# Patient Record
Sex: FEMALE | Race: WHITE | HISPANIC OR LATINO | Employment: UNEMPLOYED | URBAN - METROPOLITAN AREA
[De-identification: names, ages, dates, MRNs, and addresses within clinical notes are randomized per-mention and may not be internally consistent; named-entity substitution may affect disease eponyms.]

---

## 2024-05-03 ENCOUNTER — HOSPITAL ENCOUNTER (OUTPATIENT)
Facility: HOSPITAL | Age: 44
Discharge: HOME | End: 2024-05-04
Attending: EMERGENCY MEDICINE | Admitting: HOSPITALIST

## 2024-05-03 DIAGNOSIS — K52.9 ENTERITIS: ICD-10-CM

## 2024-05-03 DIAGNOSIS — R10.84 GENERALIZED ABDOMINAL PAIN: ICD-10-CM

## 2024-05-03 DIAGNOSIS — A41.9 SEPSIS, DUE TO UNSPECIFIED ORGANISM, UNSPECIFIED WHETHER ACUTE ORGAN DYSFUNCTION PRESENT: Primary | ICD-10-CM

## 2024-05-03 DIAGNOSIS — R07.9 CHEST PAIN: ICD-10-CM

## 2024-05-03 LAB
ALBUMIN SERPL BCP-MCNC: 4.1 G/DL (ref 3.5–5.2)
ALLENS TEST: ABNORMAL
ALP SERPL-CCNC: 85 U/L (ref 55–135)
ALT SERPL W/O P-5'-P-CCNC: 20 U/L (ref 10–44)
ANION GAP SERPL CALC-SCNC: 11 MMOL/L (ref 8–16)
AST SERPL-CCNC: 16 U/L (ref 10–40)
B-HCG UR QL: NEGATIVE
BASOPHILS # BLD AUTO: 0.04 K/UL (ref 0–0.2)
BASOPHILS NFR BLD: 0.2 % (ref 0–1.9)
BILIRUB SERPL-MCNC: 0.4 MG/DL (ref 0.1–1)
BILIRUB UR QL STRIP: NEGATIVE
BUN SERPL-MCNC: 16 MG/DL (ref 6–20)
CALCIUM SERPL-MCNC: 9.4 MG/DL (ref 8.7–10.5)
CHLORIDE SERPL-SCNC: 109 MMOL/L (ref 95–110)
CLARITY UR: ABNORMAL
CO2 SERPL-SCNC: 20 MMOL/L (ref 23–29)
COLOR UR: YELLOW
CREAT SERPL-MCNC: 0.9 MG/DL (ref 0.5–1.4)
CTP QC/QA: YES
DIFFERENTIAL METHOD BLD: ABNORMAL
EOSINOPHIL # BLD AUTO: 0 K/UL (ref 0–0.5)
EOSINOPHIL NFR BLD: 0.1 % (ref 0–8)
ERYTHROCYTE [DISTWIDTH] IN BLOOD BY AUTOMATED COUNT: 11.8 % (ref 11.5–14.5)
EST. GFR  (NO RACE VARIABLE): >60 ML/MIN/1.73 M^2
GLUCOSE SERPL-MCNC: 130 MG/DL (ref 70–110)
GLUCOSE UR QL STRIP: NEGATIVE
HCO3 UR-SCNC: 17.8 MMOL/L (ref 24–28)
HCT VFR BLD AUTO: 41.8 % (ref 37–48.5)
HGB BLD-MCNC: 14.2 G/DL (ref 12–16)
HGB UR QL STRIP: NEGATIVE
IMM GRANULOCYTES # BLD AUTO: 0.09 K/UL (ref 0–0.04)
IMM GRANULOCYTES NFR BLD AUTO: 0.6 % (ref 0–0.5)
KETONES UR QL STRIP: ABNORMAL
LACTATE SERPL-SCNC: 1.7 MMOL/L (ref 0.5–2.2)
LACTATE SERPL-SCNC: 3 MMOL/L (ref 0.5–2.2)
LEUKOCYTE ESTERASE UR QL STRIP: NEGATIVE
LIPASE SERPL-CCNC: 22 U/L (ref 4–60)
LYMPHOCYTES # BLD AUTO: 0.7 K/UL (ref 1–4.8)
LYMPHOCYTES NFR BLD: 4 % (ref 18–48)
MCH RBC QN AUTO: 31.3 PG (ref 27–31)
MCHC RBC AUTO-ENTMCNC: 34 G/DL (ref 32–36)
MCV RBC AUTO: 92 FL (ref 82–98)
MONOCYTES # BLD AUTO: 0.7 K/UL (ref 0.3–1)
MONOCYTES NFR BLD: 4.1 % (ref 4–15)
NEUTROPHILS # BLD AUTO: 14.8 K/UL (ref 1.8–7.7)
NEUTROPHILS NFR BLD: 91 % (ref 38–73)
NITRITE UR QL STRIP: NEGATIVE
NRBC BLD-RTO: 0 /100 WBC
PCO2 BLDA: 35.6 MMHG (ref 35–45)
PH SMN: 7.31 [PH] (ref 7.35–7.45)
PH UR STRIP: 5 [PH] (ref 5–8)
PLATELET # BLD AUTO: 242 K/UL (ref 150–450)
PMV BLD AUTO: 8.7 FL (ref 9.2–12.9)
PO2 BLDA: 41 MMHG (ref 40–60)
POC BE: -8 MMOL/L
POC SATURATED O2: 71 % (ref 95–100)
POC TCO2: 19 MMOL/L (ref 24–29)
POTASSIUM SERPL-SCNC: 3.4 MMOL/L (ref 3.5–5.1)
PROT SERPL-MCNC: 7.7 G/DL (ref 6–8.4)
PROT UR QL STRIP: ABNORMAL
RBC # BLD AUTO: 4.54 M/UL (ref 4–5.4)
SAMPLE: ABNORMAL
SITE: ABNORMAL
SODIUM SERPL-SCNC: 140 MMOL/L (ref 136–145)
SP GR UR STRIP: >1.03 (ref 1–1.03)
URN SPEC COLLECT METH UR: ABNORMAL
UROBILINOGEN UR STRIP-ACNC: NEGATIVE EU/DL
WBC # BLD AUTO: 16.24 K/UL (ref 3.9–12.7)

## 2024-05-03 PROCEDURE — 83690 ASSAY OF LIPASE: CPT | Performed by: NURSE PRACTITIONER

## 2024-05-03 PROCEDURE — 99900035 HC TECH TIME PER 15 MIN (STAT)

## 2024-05-03 PROCEDURE — 25500020 PHARM REV CODE 255: Performed by: NURSE PRACTITIONER

## 2024-05-03 PROCEDURE — G0378 HOSPITAL OBSERVATION PER HR: HCPCS

## 2024-05-03 PROCEDURE — 96361 HYDRATE IV INFUSION ADD-ON: CPT

## 2024-05-03 PROCEDURE — 96365 THER/PROPH/DIAG IV INF INIT: CPT

## 2024-05-03 PROCEDURE — 96366 THER/PROPH/DIAG IV INF ADDON: CPT

## 2024-05-03 PROCEDURE — 80053 COMPREHEN METABOLIC PANEL: CPT | Performed by: NURSE PRACTITIONER

## 2024-05-03 PROCEDURE — 36415 COLL VENOUS BLD VENIPUNCTURE: CPT | Performed by: PHYSICIAN ASSISTANT

## 2024-05-03 PROCEDURE — 25000003 PHARM REV CODE 250: Performed by: NURSE PRACTITIONER

## 2024-05-03 PROCEDURE — 82803 BLOOD GASES ANY COMBINATION: CPT

## 2024-05-03 PROCEDURE — 83605 ASSAY OF LACTIC ACID: CPT | Mod: 91 | Performed by: PHYSICIAN ASSISTANT

## 2024-05-03 PROCEDURE — 83605 ASSAY OF LACTIC ACID: CPT | Performed by: NURSE PRACTITIONER

## 2024-05-03 PROCEDURE — 85025 COMPLETE CBC W/AUTO DIFF WBC: CPT | Performed by: NURSE PRACTITIONER

## 2024-05-03 PROCEDURE — 81003 URINALYSIS AUTO W/O SCOPE: CPT | Performed by: NURSE PRACTITIONER

## 2024-05-03 PROCEDURE — 96374 THER/PROPH/DIAG INJ IV PUSH: CPT | Mod: 59

## 2024-05-03 PROCEDURE — 63600175 PHARM REV CODE 636 W HCPCS: Performed by: NURSE PRACTITIONER

## 2024-05-03 PROCEDURE — 87040 BLOOD CULTURE FOR BACTERIA: CPT | Performed by: NURSE PRACTITIONER

## 2024-05-03 PROCEDURE — 99285 EMERGENCY DEPT VISIT HI MDM: CPT | Mod: 25

## 2024-05-03 PROCEDURE — 96375 TX/PRO/DX INJ NEW DRUG ADDON: CPT

## 2024-05-03 PROCEDURE — 81025 URINE PREGNANCY TEST: CPT | Performed by: NURSE PRACTITIONER

## 2024-05-03 RX ORDER — SODIUM CHLORIDE 0.9 % (FLUSH) 0.9 %
10 SYRINGE (ML) INJECTION EVERY 12 HOURS PRN
Status: DISCONTINUED | OUTPATIENT
Start: 2024-05-03 | End: 2024-05-04 | Stop reason: HOSPADM

## 2024-05-03 RX ORDER — ACETAMINOPHEN 325 MG/1
650 TABLET ORAL EVERY 6 HOURS PRN
Status: DISCONTINUED | OUTPATIENT
Start: 2024-05-03 | End: 2024-05-04 | Stop reason: HOSPADM

## 2024-05-03 RX ORDER — ONDANSETRON HYDROCHLORIDE 2 MG/ML
8 INJECTION, SOLUTION INTRAVENOUS
Status: COMPLETED | OUTPATIENT
Start: 2024-05-03 | End: 2024-05-03

## 2024-05-03 RX ORDER — NALOXONE HCL 0.4 MG/ML
0.02 VIAL (ML) INJECTION
Status: DISCONTINUED | OUTPATIENT
Start: 2024-05-03 | End: 2024-05-04 | Stop reason: HOSPADM

## 2024-05-03 RX ORDER — IBUPROFEN 200 MG
24 TABLET ORAL
Status: DISCONTINUED | OUTPATIENT
Start: 2024-05-03 | End: 2024-05-04 | Stop reason: HOSPADM

## 2024-05-03 RX ORDER — KETOROLAC TROMETHAMINE 30 MG/ML
15 INJECTION, SOLUTION INTRAMUSCULAR; INTRAVENOUS
Status: COMPLETED | OUTPATIENT
Start: 2024-05-03 | End: 2024-05-03

## 2024-05-03 RX ORDER — SODIUM CHLORIDE 9 MG/ML
INJECTION, SOLUTION INTRAVENOUS CONTINUOUS
Status: DISCONTINUED | OUTPATIENT
Start: 2024-05-03 | End: 2024-05-04 | Stop reason: HOSPADM

## 2024-05-03 RX ORDER — GLUCAGON 1 MG
1 KIT INJECTION
Status: DISCONTINUED | OUTPATIENT
Start: 2024-05-03 | End: 2024-05-04 | Stop reason: HOSPADM

## 2024-05-03 RX ORDER — ONDANSETRON HYDROCHLORIDE 2 MG/ML
4 INJECTION, SOLUTION INTRAVENOUS EVERY 6 HOURS PRN
Status: DISCONTINUED | OUTPATIENT
Start: 2024-05-03 | End: 2024-05-04 | Stop reason: HOSPADM

## 2024-05-03 RX ORDER — IBUPROFEN 200 MG
16 TABLET ORAL
Status: DISCONTINUED | OUTPATIENT
Start: 2024-05-03 | End: 2024-05-04 | Stop reason: HOSPADM

## 2024-05-03 RX ADMIN — PIPERACILLIN AND TAZOBACTAM 4.5 G: 4; .5 INJECTION, POWDER, LYOPHILIZED, FOR SOLUTION INTRAVENOUS; PARENTERAL at 06:05

## 2024-05-03 RX ADMIN — PIPERACILLIN AND TAZOBACTAM 4.5 G: 4; .5 INJECTION, POWDER, LYOPHILIZED, FOR SOLUTION INTRAVENOUS; PARENTERAL at 10:05

## 2024-05-03 RX ADMIN — IOHEXOL 80 ML: 350 INJECTION, SOLUTION INTRAVENOUS at 09:05

## 2024-05-03 RX ADMIN — SODIUM CHLORIDE 1000 ML: 9 INJECTION, SOLUTION INTRAVENOUS at 08:05

## 2024-05-03 RX ADMIN — KETOROLAC TROMETHAMINE 15 MG: 30 INJECTION, SOLUTION INTRAMUSCULAR at 08:05

## 2024-05-03 RX ADMIN — SODIUM CHLORIDE: 9 INJECTION, SOLUTION INTRAVENOUS at 12:05

## 2024-05-03 RX ADMIN — ONDANSETRON 8 MG: 2 INJECTION INTRAMUSCULAR; INTRAVENOUS at 08:05

## 2024-05-03 NOTE — ASSESSMENT & PLAN NOTE
Presents with nausea, vomiting, diarrhea. WBC of 16, HR of 108, lactic acid 3.0. CT scan with findings suggesting enteritis.   NPO-advance as tolerated  Zosyn  Blood cultures pending  Check stool studies, with culture, WBC, and C diff.   CT scan with upper limits of normal appendix without findings suggesting inflammation. No discrete RLQ tenderness. Suspect symptoms related to eneteritis.       This patient does have evidence of infective focus  My overall impression is sepsis.  Source: Abdominal  Antibiotics given-   Antibiotics (72h ago, onward)      Start     Stop Route Frequency Ordered    05/03/24 1028  piperacillin-tazobactam (ZOSYN) 4.5 g in dextrose 5 % in water (D5W) 100 mL IVPB (MB+)         -- IV Every 8 hours (non-standard times) 05/03/24 1028          Latest lactate reviewed-  Recent Labs   Lab 05/03/24  0830   LACTATE 3.0*     Organ dysfunction indicated by  N/a    Fluid challenge Ideal Body Weight- The patient's ideal body weight is Ideal body weight: 47.8 kg (105 lb 6.1 oz) which will be used to calculate fluid bolus of 30 ml/kg for treatment of septic shock.      Post- resuscitation assessment Yes Perfusion exam was performed within 6 hours of septic shock presentation after bolus shows Adequate tissue perfusion assessed by non-invasive monitoring       Will Not start Pressors- Levophed for MAP of 65  Source control achieved by: IV antibiotics

## 2024-05-03 NOTE — PHARMACY MED REC
"Admission Medication History     The home medication history was taken by Keri Dominguez CPhT.    You may go to "Admission" then "Reconcile Home Medications" tabs to review and/or act upon these items.     The home medication list has been updated by the Pharmacy department.   Please read ALL comments highlighted in yellow.   Please address this information as you see fit.    Feel free to contact us if you have any questions or require assistance.        Medications listed below were obtained from: Patient/family  Current Facility-Administered Medications   Medication Dose Route Frequency Provider Last Rate Last Admin    0.9%  NaCl infusion   Intravenous Continuous Jigna, Manicia S., NP        acetaminophen tablet 650 mg  650 mg Oral Q6H PRN Jigna, Manicia S., NP        dextrose 10% bolus 125 mL 125 mL  12.5 g Intravenous PRN Jigna, Manicia S., NP        dextrose 10% bolus 250 mL 250 mL  25 g Intravenous PRN Jigna, Manicia S., NP        glucagon (human recombinant) injection 1 mg  1 mg Intramuscular PRN Jigna, Manicia S., NP        glucose chewable tablet 16 g  16 g Oral PRN Jigna, Manicia S., NP        glucose chewable tablet 24 g  24 g Oral PRN Jigna, Manicia S., NP        naloxone 0.4 mg/mL injection 0.02 mg  0.02 mg Intravenous PRN Jigna, Manicia S., NP        ondansetron injection 4 mg  4 mg Intravenous Q6H PRN Jigna, Manicia S., NP        piperacillin-tazobactam (ZOSYN) 4.5 g in dextrose 5 % in water (D5W) 100 mL IVPB (MB+)  4.5 g Intravenous Q8H Papa Watkins, NP 25 mL/hr at 05/03/24 1053 4.5 g at 05/03/24 1053    sodium chloride 0.9% flush 10 mL  10 mL Intravenous Q12H PRN Jigna, Manicia S., NP         No current outpatient medications on file.         Patient stated she took Pepto Bismuth and drunk ray tea for stomach upset this morning (0500 hrs) with no relief.    Patient is not taking any prescribed medications at this time.     #[id number]    Keri Dominguez " [facility].  [phone]                  .

## 2024-05-03 NOTE — SUBJECTIVE & OBJECTIVE
No past medical history on file.    No past surgical history on file.    Review of patient's allergies indicates:  No Known Allergies    Current Facility-Administered Medications   Medication Dose Route Frequency Provider Last Rate Last Admin    0.9%  NaCl infusion   Intravenous Continuous Jigna, Manicia S., NP 75 mL/hr at 05/03/24 1236 New Bag at 05/03/24 1236    acetaminophen tablet 650 mg  650 mg Oral Q6H PRN Jigna, Manicia S., NP        dextrose 10% bolus 125 mL 125 mL  12.5 g Intravenous PRN Jigna, Manicia S., NP        dextrose 10% bolus 250 mL 250 mL  25 g Intravenous PRN Jigna, Manicia S., NP        glucagon (human recombinant) injection 1 mg  1 mg Intramuscular PRN Jigna, Manicia S., NP        glucose chewable tablet 16 g  16 g Oral PRN Jigna, Manicia S., NP        glucose chewable tablet 24 g  24 g Oral PRN Jigna, Manicia S., NP        naloxone 0.4 mg/mL injection 0.02 mg  0.02 mg Intravenous PRN Jigna, Manicia S., NP        ondansetron injection 4 mg  4 mg Intravenous Q6H PRN Jigna, Manicia S., NP        piperacillin-tazobactam (ZOSYN) 4.5 g in dextrose 5 % in water (D5W) 100 mL IVPB (MB+)  4.5 g Intravenous Q8H Papa Watkins NP 25 mL/hr at 05/03/24 1053 4.5 g at 05/03/24 1053    sodium chloride 0.9% flush 10 mL  10 mL Intravenous Q12H PRN Jigna, Manicia S., NP         No current outpatient medications on file.     Family History    None       Tobacco Use    Smoking status: Never    Smokeless tobacco: Never   Substance and Sexual Activity    Alcohol use: Not Currently    Drug use: Not Currently    Sexual activity: Not on file     Review of Systems   Constitutional:  Negative for chills and fever.   HENT:  Negative for nosebleeds and tinnitus.    Eyes:  Negative for photophobia and visual disturbance.   Respiratory:  Negative for shortness of breath and wheezing.    Cardiovascular:  Negative for chest pain, palpitations and leg swelling.   Gastrointestinal:  Positive for abdominal  pain, diarrhea, nausea and vomiting. Negative for abdominal distention.   Genitourinary:  Negative for dysuria, flank pain and hematuria.   Musculoskeletal:  Negative for gait problem and joint swelling.   Skin:  Negative for rash and wound.   Neurological:  Negative for seizures and syncope.     Objective:     Vital Signs (Most Recent):  Temp: 99 °F (37.2 °C) (05/03/24 0746)  Pulse: 89 (05/03/24 1034)  Resp: 18 (05/03/24 1034)  BP: 112/63 (05/03/24 1034)  SpO2: 97 % (05/03/24 1034) Vital Signs (24h Range):  Temp:  [99 °F (37.2 °C)] 99 °F (37.2 °C)  Pulse:  [] 89  Resp:  [18] 18  SpO2:  [97 %-98 %] 97 %  BP: (100-112)/(50-63) 112/63     Weight: 63 kg (139 lb)  Body mass index is 26.26 kg/m².     Physical Exam  Vitals and nursing note reviewed.   Constitutional:       General: She is not in acute distress.     Appearance: She is well-developed. She is not diaphoretic.   HENT:      Head: Normocephalic and atraumatic.      Right Ear: External ear normal.      Left Ear: External ear normal.   Eyes:      General:         Right eye: No discharge.         Left eye: No discharge.      Conjunctiva/sclera: Conjunctivae normal.   Neck:      Thyroid: No thyromegaly.   Cardiovascular:      Rate and Rhythm: Normal rate and regular rhythm.      Heart sounds: No murmur heard.  Pulmonary:      Effort: Pulmonary effort is normal. No respiratory distress.      Breath sounds: Normal breath sounds.   Abdominal:      General: Bowel sounds are normal. There is no distension.      Palpations: Abdomen is soft. There is no mass.      Tenderness: There is abdominal tenderness (diffuse abdominal tenderness).   Musculoskeletal:         General: No deformity.      Cervical back: Normal range of motion and neck supple.      Right lower leg: No edema.      Left lower leg: No edema.   Skin:     General: Skin is warm and dry.   Neurological:      Mental Status: She is alert and oriented to person, place, and time.      Sensory: No sensory  deficit.   Psychiatric:         Mood and Affect: Mood normal.         Behavior: Behavior normal.                Significant Labs: CBC:   Recent Labs   Lab 05/03/24  0830   WBC 16.24*   HGB 14.2   HCT 41.8        CMP:   Recent Labs   Lab 05/03/24  0830      K 3.4*      CO2 20*   *   BUN 16   CREATININE 0.9   CALCIUM 9.4   PROT 7.7   ALBUMIN 4.1   BILITOT 0.4   ALKPHOS 85   AST 16   ALT 20   ANIONGAP 11     Lactic Acid:   Recent Labs   Lab 05/03/24  0830   LACTATE 3.0*     Lipase:   Recent Labs   Lab 05/03/24  0830   LIPASE 22     Urine Studies:   Recent Labs   Lab 05/03/24  0947   COLORU Yellow   APPEARANCEUA Hazy*   PHUR 5.0   SPECGRAV >1.030*   PROTEINUA Trace*   GLUCUA Negative   KETONESU Trace*   BILIRUBINUA Negative   OCCULTUA Negative   NITRITE Negative   UROBILINOGEN Negative   LEUKOCYTESUR Negative       Significant Imaging:   Imaging Results              CT Abdomen Pelvis With IV Contrast NO Oral Contrast (Final result)  Result time 05/03/24 10:16:15      Final result by Randolph Martin MD (05/03/24 10:16:15)                   Impression:      1. Mild wall thickening mucosal hyperemia of mid to distal small bowel loops with intraluminal fluid, may reflect infectious or noninfectious inflammatory enteritis.  Additional nonspecific intraluminal fluid within normal caliber colon, as may be seen in the setting of diarrheal illness.  2. The appendix is noted to measure at the upper limits of normal without definite wall thickening or associated periappendiceal inflammatory change.  3. Additional details, as provided in the body of report.      Electronically signed by: Randolph Martin  Date:    05/03/2024  Time:    10:16               Narrative:    EXAMINATION:  CT ABDOMEN PELVIS WITH IV CONTRAST    CLINICAL HISTORY:  Abdominal pain, acute, nonlocalized;Nausea/vomiting;    TECHNIQUE:  Low dose axial images, sagittal and coronal reformations were obtained from the lung bases to the  pubic symphysis following the IV administration of 80 mL of Omnipaque 350    COMPARISON:  None.    FINDINGS:  Lower chest: Unremarkable.    Liver: Unremarkable.    Gallbladder and bile ducts: Gallbladder not seen, may be surgically absent.    Pancreas: Unremarkable.    Spleen: Unremarkable.    Adrenals: Unremarkable.    Kidneys: Unremarkable.    Lymph nodes: No abdominal or pelvic lymphadenopathy.    Bowel and mesentery: No definite evidence of acute bowel obstruction.  Appendix measures at the upper limits of normal at approximately 7-8 mm without definite wall thickening or associated periappendiceal inflammatory change.  Colonic diverticulosis.  Nonspecific intraluminal fluid within normal caliber colon, as may be seen in setting of a nonspecific diarrheal illness.    Mild wall thickening and enhancement of mid to distal small bowel loops with intraluminal fluid.    Abdominal aorta: Unremarkable.    Inferior vena cava: Unremarkable.    Free fluid or free air: None.    Pelvis: Unremarkable.    Body wall: Small fat containing ventral/umbilical hernia.    Bones: Unremarkable.

## 2024-05-03 NOTE — NURSING
[facility] [place]  Nurses Note -- 4 Eyes      5/3/2024       Skin assessed on: Admit      [x] No Pressure Injuries Present    [x]Prevention Measures Documented    [] Yes LDA  for Pressure Injury Previously documented     [] Yes New Pressure Injury Discovered   [] LDA for New Pressure Injury Added      Attending RN:  Sherly Dillon, RN     Second :  Rachel PAYNE, PCT

## 2024-05-03 NOTE — ED PROVIDER NOTES
Encounter Date: 5/3/2024    SCRIBE #1 NOTE: I, Tatianabilly Ghosh, am scribing for, and in the presence of,  Papa Watkins NP. I have scribed the following portions of the note - Other sections scribed: HPI, ROS.       History     Chief Complaint   Patient presents with    Abdominal Pain     Pt c/o generalized abd pain vomiting and diarrhea since 4am. Denies fever. Took pepto bismol     This 43 y.o female, with no medical history, presents to the ED c/o acute, constant, severe (10/10) generalized abdominal pain that awoke her from sleep at 4:30 am this morning. Pt states that the pain has been accompanied by several episodes of diarrhea (dark stool) as well as 4x episodes of emesis. She reports taking Pepto Bismol for treatment, but notes that she vomited up the medication. There are no other associated symptoms at this time. No alleviating factors.     The history is provided by the patient. The history is limited by a language barrier (Patient is Pakistani speaking). A  was used ( ID#:[id number]).     Review of patient's allergies indicates:  No Known Allergies  No past medical history on file.  No past surgical history on file.  No family history on file.  Social History     Tobacco Use    Smoking status: Never    Smokeless tobacco: Never   Substance Use Topics    Alcohol use: Not Currently    Drug use: Not Currently     Review of Systems   Constitutional:  Negative for fever.   HENT:  Negative for sore throat.    Eyes:  Negative for visual disturbance.   Respiratory:  Negative for shortness of breath.    Cardiovascular:  Negative for chest pain.   Gastrointestinal:  Positive for abdominal pain (generalized), diarrhea, nausea and vomiting.   Genitourinary:  Negative for dysuria.   Musculoskeletal:  Negative for back pain.   Skin:  Negative for rash.   Neurological:  Negative for weakness.       Physical Exam     Initial Vitals [05/03/24 0746]   BP Pulse Resp Temp SpO2   (!) 100/50 108 18 99  °F (37.2 °C) 98 %      MAP       --         Physical Exam    ED Course   Procedures  Labs Reviewed   CBC W/ AUTO DIFFERENTIAL - Abnormal; Notable for the following components:       Result Value    WBC 16.24 (*)     MCH 31.3 (*)     MPV 8.7 (*)     Immature Granulocytes 0.6 (*)     Gran # (ANC) 14.8 (*)     Immature Grans (Abs) 0.09 (*)     Lymph # 0.7 (*)     Gran % 91.0 (*)     Lymph % 4.0 (*)     All other components within normal limits   COMPREHENSIVE METABOLIC PANEL - Abnormal; Notable for the following components:    Potassium 3.4 (*)     CO2 20 (*)     Glucose 130 (*)     All other components within normal limits   LACTIC ACID, PLASMA - Abnormal; Notable for the following components:    Lactate (Lactic Acid) 3.0 (*)     All other components within normal limits   URINALYSIS, REFLEX TO URINE CULTURE - Abnormal; Notable for the following components:    Appearance, UA Hazy (*)     Specific Gravity, UA >1.030 (*)     Protein, UA Trace (*)     Ketones, UA Trace (*)     All other components within normal limits    Narrative:     Specimen Source->Urine   ISTAT PROCEDURE - Abnormal; Notable for the following components:    POC PH 7.306 (*)     POC HCO3 17.8 (*)     POC BE -8 (*)     POC TCO2 19 (*)     All other components within normal limits   CULTURE, BLOOD   CULTURE, BLOOD   LIPASE   POCT URINE PREGNANCY          Imaging Results              CT Abdomen Pelvis With IV Contrast NO Oral Contrast (Final result)  Result time 05/03/24 10:16:15      Final result by Randolph Martin MD (05/03/24 10:16:15)                   Impression:      1. Mild wall thickening mucosal hyperemia of mid to distal small bowel loops with intraluminal fluid, may reflect infectious or noninfectious inflammatory enteritis.  Additional nonspecific intraluminal fluid within normal caliber colon, as may be seen in the setting of diarrheal illness.  2. The appendix is noted to measure at the upper limits of normal without definite wall  thickening or associated periappendiceal inflammatory change.  3. Additional details, as provided in the body of report.      Electronically signed by: Randolph Martin  Date:    05/03/2024  Time:    10:16               Narrative:    EXAMINATION:  CT ABDOMEN PELVIS WITH IV CONTRAST    CLINICAL HISTORY:  Abdominal pain, acute, nonlocalized;Nausea/vomiting;    TECHNIQUE:  Low dose axial images, sagittal and coronal reformations were obtained from the lung bases to the pubic symphysis following the IV administration of 80 mL of Omnipaque 350    COMPARISON:  None.    FINDINGS:  Lower chest: Unremarkable.    Liver: Unremarkable.    Gallbladder and bile ducts: Gallbladder not seen, may be surgically absent.    Pancreas: Unremarkable.    Spleen: Unremarkable.    Adrenals: Unremarkable.    Kidneys: Unremarkable.    Lymph nodes: No abdominal or pelvic lymphadenopathy.    Bowel and mesentery: No definite evidence of acute bowel obstruction.  Appendix measures at the upper limits of normal at approximately 7-8 mm without definite wall thickening or associated periappendiceal inflammatory change.  Colonic diverticulosis.  Nonspecific intraluminal fluid within normal caliber colon, as may be seen in setting of a nonspecific diarrheal illness.    Mild wall thickening and enhancement of mid to distal small bowel loops with intraluminal fluid.    Abdominal aorta: Unremarkable.    Inferior vena cava: Unremarkable.    Free fluid or free air: None.    Pelvis: Unremarkable.    Body wall: Small fat containing ventral/umbilical hernia.    Bones: Unremarkable.                                       Medications   piperacillin-tazobactam (ZOSYN) 4.5 g in dextrose 5 % in water (D5W) 100 mL IVPB (MB+) (4.5 g Intravenous New Bag 5/3/24 1053)   sodium chloride 0.9% bolus 1,000 mL 1,000 mL (0 mLs Intravenous Stopped 5/3/24 1002)   ondansetron injection 8 mg (8 mg Intravenous Given 5/3/24 0838)   ketorolac injection 15 mg (15 mg Intravenous Given  5/3/24 0838)   sodium chloride 0.9% bolus 1,000 mL 1,000 mL (0 mLs Intravenous Stopped 5/3/24 1034)   iohexoL (OMNIPAQUE 350) injection 80 mL (80 mLs Intravenous Given 5/3/24 0918)     Medical Decision Making  HPI and physical exam as above.  Exam remarkable for abdominal tenderness but no peritoneal signs on exam.  Patient was was initially hypotensive and tachycardic but vitals have improved after 2 L of IV fluid.  Labs with leukocytosis and lactic acidosis.  There is concern for sepsis.  Blood cultures in process.  CT shows a nonspecific enteritis without any evidence of perforation or abscess.  Urine does not appear to be infected.  In addition to IV fluids the patient has been given Zosyn, Toradol, and Zofran in the ED. Patient will be placed in observation for further management by Hospital Medicine.    Amount and/or Complexity of Data Reviewed  Labs: ordered. Decision-making details documented in ED Course.  Radiology: ordered. Decision-making details documented in ED Course.    Risk  Prescription drug management.  Decision regarding hospitalization.            Scribe Attestation:   Scribe #1: I performed the above scribed service and the documentation accurately describes the services I performed. I attest to the accuracy of the note.                         I, Papa Watkins NP, personally performed the services described in this documentation. All medical record entries made by the scribe were at my direction and in my presence. I have reviewed the chart and agree that the record reflects my personal performance and is accurate and complete.       Clinical Impression:  Final diagnoses:  [A41.9] Sepsis, due to unspecified organism, unspecified whether acute organ dysfunction present (Primary)  [R10.84] Generalized abdominal pain          ED Disposition Condition    Observation Stable                Papa Watkins NP  05/03/24 0911

## 2024-05-03 NOTE — ADMISSIONCARE
AdmissionCare    Guideline: Abdominal Pain, Undiagnosed, Observation    Based on the indications selected for the patient, the bed status of Observation was determined to be MET    The following indications were selected as present at the time of evaluation of the patient:      - Pain that persists despite emergency department care   - Patient ability to maintain hydration unclear    AdmissionCare documentation entered by: Tatyana Salas    [facility] [employer], 27th edition, Copyright © 2023 [facility] [employer], [facility] All Rights Reserved.  [id number]:00

## 2024-05-03 NOTE — NURSING TRANSFER
Nursing Transfer Note      5/3/2024   4:32 PM    Reason patient is being transferred: Sepsis    Transfer From: ED    Transfer via wheelchair    Transfer with cardiac monitoring    Medicines sent: none    Any special needs or follow-up needed: none    Patient belongings transferred with patient: Yes    Chart send with patient: No    Patient reassessed at: 5/3/24 @ 1632     Upon arrival to floor: patient oriented to room, call bell in reach, and bed in lowest position

## 2024-05-03 NOTE — HPI
Millicent Sun 43 y.o. female with no PMHx the hospital with a chief complaint of abdominal pain.  She reports 4 days of constant progressive abdominal pain described as cramping throughout her entire abdomen.  It was associated with nausea vomiting and diarrhea.  She reports her last episode of diarrhea was this morning.  She attempted treatment home with Pepto-Bismol without improvement.  She denies any recent antibiotic use travel or sick contacts.  She takes no daily medications.  She denies fever chest pain shortness breast leg swelling melena hematuria hematemesis dizziness and syncope.    In the ED, white blood cell count 16 tachycardic on arrival to 108 lactic acid 3.0 lipase negative CT scan with  Mild wall thickening and mucosal hyperemia of the distal mid small bowel loops which may reflect infectious or non infectious inflammatory enteritis.  History obtained with language line  ID# [id number].

## 2024-05-03 NOTE — H&P
[facility] Emergency Dept  [facility] Medicine  History & Physical    Patient Name: Millicent Sun  MRN: [id number]  Patient Class: OP- Observation  Admission Date: 5/3/2024  Attending Physician: Katja Jackson MD   Primary Care Provider: No primary care provider on file.         Patient information was obtained from patient, past medical records, and ER records.     Subjective:     Principal Problem:Sepsis    Chief Complaint:   Chief Complaint   Patient presents with    Abdominal Pain     Pt c/o generalized abd pain vomiting and diarrhea since 4am. Denies fever. Took pepto bismol        HPI: Millicent Sun 43 y.o. female with no PMHx the hospital with a chief complaint of abdominal pain.  She reports 4 days of constant progressive abdominal pain described as cramping throughout her entire abdomen.  It was associated with nausea vomiting and diarrhea.  She reports her last episode of diarrhea was this morning.  She attempted treatment home with Pepto-Bismol without improvement.  She denies any recent antibiotic use travel or sick contacts.  She takes no daily medications.  She denies fever chest pain shortness breast leg swelling melena hematuria hematemesis dizziness and syncope.    In the ED, white blood cell count 16 tachycardic on arrival to 108 lactic acid 3.0 lipase negative CT scan with  Mild wall thickening and mucosal hyperemia of the distal mid small bowel loops which may reflect infectious or non infectious inflammatory enteritis.  History obtained with language line  ID# [id number].     No past medical history on file.    No past surgical history on file.    Review of patient's allergies indicates:  No Known Allergies    Current Facility-Administered Medications   Medication Dose Route Frequency Provider Last Rate Last Admin    0.9%  NaCl infusion   Intravenous Continuous Danette Benito NP 75 mL/hr at 05/03/24 1236 New Bag at 05/03/24 1236    acetaminophen tablet 650 mg  650 mg  Oral Q6H PRN Jigna, Manicia S., NP        dextrose 10% bolus 125 mL 125 mL  12.5 g Intravenous PRN Jigna, Manicia S., NP        dextrose 10% bolus 250 mL 250 mL  25 g Intravenous PRN Jigna, Manicia S., NP        glucagon (human recombinant) injection 1 mg  1 mg Intramuscular PRN Jigna, Manicia S., NP        glucose chewable tablet 16 g  16 g Oral PRN Jigna, Manicia S., NP        glucose chewable tablet 24 g  24 g Oral PRN Jigna, Manicia S., NP        naloxone 0.4 mg/mL injection 0.02 mg  0.02 mg Intravenous PRN Jigna, Manicia S., NP        ondansetron injection 4 mg  4 mg Intravenous Q6H PRN Jigna, Manicia S., NP        piperacillin-tazobactam (ZOSYN) 4.5 g in dextrose 5 % in water (D5W) 100 mL IVPB (MB+)  4.5 g Intravenous Q8H Papa Watkins NP 25 mL/hr at 05/03/24 1053 4.5 g at 05/03/24 1053    sodium chloride 0.9% flush 10 mL  10 mL Intravenous Q12H PRN Jigna, Manicia S., NP         No current outpatient medications on file.     Family History    None       Tobacco Use    Smoking status: Never    Smokeless tobacco: Never   Substance and Sexual Activity    Alcohol use: Not Currently    Drug use: Not Currently    Sexual activity: Not on file     Review of Systems   Constitutional:  Negative for chills and fever.   HENT:  Negative for nosebleeds and tinnitus.    Eyes:  Negative for photophobia and visual disturbance.   Respiratory:  Negative for shortness of breath and wheezing.    Cardiovascular:  Negative for chest pain, palpitations and leg swelling.   Gastrointestinal:  Positive for abdominal pain, diarrhea, nausea and vomiting. Negative for abdominal distention.   Genitourinary:  Negative for dysuria, flank pain and hematuria.   Musculoskeletal:  Negative for gait problem and joint swelling.   Skin:  Negative for rash and wound.   Neurological:  Negative for seizures and syncope.     Objective:     Vital Signs (Most Recent):  Temp: 99 °F (37.2 °C) (05/03/24 0746)  Pulse: 89 (05/03/24  1034)  Resp: 18 (05/03/24 1034)  BP: 112/63 (05/03/24 1034)  SpO2: 97 % (05/03/24 1034) Vital Signs (24h Range):  Temp:  [99 °F (37.2 °C)] 99 °F (37.2 °C)  Pulse:  [] 89  Resp:  [18] 18  SpO2:  [97 %-98 %] 97 %  BP: (100-112)/(50-63) 112/63     Weight: 63 kg (139 lb)  Body mass index is 26.26 kg/m².     Physical Exam  Vitals and nursing note reviewed.   Constitutional:       General: She is not in acute distress.     Appearance: She is well-developed. She is not diaphoretic.   HENT:      Head: Normocephalic and atraumatic.      Right Ear: External ear normal.      Left Ear: External ear normal.   Eyes:      General:         Right eye: No discharge.         Left eye: No discharge.      Conjunctiva/sclera: Conjunctivae normal.   Neck:      Thyroid: No thyromegaly.   Cardiovascular:      Rate and Rhythm: Normal rate and regular rhythm.      Heart sounds: No murmur heard.  Pulmonary:      Effort: Pulmonary effort is normal. No respiratory distress.      Breath sounds: Normal breath sounds.   Abdominal:      General: Bowel sounds are normal. There is no distension.      Palpations: Abdomen is soft. There is no mass.      Tenderness: There is abdominal tenderness (diffuse abdominal tenderness).   Musculoskeletal:         General: No deformity.      Cervical back: Normal range of motion and neck supple.      Right lower leg: No edema.      Left lower leg: No edema.   Skin:     General: Skin is warm and dry.   Neurological:      Mental Status: She is alert and oriented to person, place, and time.      Sensory: No sensory deficit.   Psychiatric:         Mood and Affect: Mood normal.         Behavior: Behavior normal.                Significant Labs: CBC:   Recent Labs   Lab 05/03/24  0830   WBC 16.24*   HGB 14.2   HCT 41.8        CMP:   Recent Labs   Lab 05/03/24  0830      K 3.4*      CO2 20*   *   BUN 16   CREATININE 0.9   CALCIUM 9.4   PROT 7.7   ALBUMIN 4.1   BILITOT 0.4   ALKPHOS 85   AST  16   ALT 20   ANIONGAP 11     Lactic Acid:   Recent Labs   Lab 05/03/24  0830   LACTATE 3.0*     Lipase:   Recent Labs   Lab 05/03/24  0830   LIPASE 22     Urine Studies:   Recent Labs   Lab 05/03/24  0947   COLORU Yellow   APPEARANCEUA Hazy*   PHUR 5.0   SPECGRAV >1.030*   PROTEINUA Trace*   GLUCUA Negative   KETONESU Trace*   BILIRUBINUA Negative   OCCULTUA Negative   NITRITE Negative   UROBILINOGEN Negative   LEUKOCYTESUR Negative       Significant Imaging:   Imaging Results              CT Abdomen Pelvis With IV Contrast NO Oral Contrast (Final result)  Result time 05/03/24 10:16:15      Final result by Randolph Martin MD (05/03/24 10:16:15)                   Impression:      1. Mild wall thickening mucosal hyperemia of mid to distal small bowel loops with intraluminal fluid, may reflect infectious or noninfectious inflammatory enteritis.  Additional nonspecific intraluminal fluid within normal caliber colon, as may be seen in the setting of diarrheal illness.  2. The appendix is noted to measure at the upper limits of normal without definite wall thickening or associated periappendiceal inflammatory change.  3. Additional details, as provided in the body of report.      Electronically signed by: Randolph Martin  Date:    05/03/2024  Time:    10:16               Narrative:    EXAMINATION:  CT ABDOMEN PELVIS WITH IV CONTRAST    CLINICAL HISTORY:  Abdominal pain, acute, nonlocalized;Nausea/vomiting;    TECHNIQUE:  Low dose axial images, sagittal and coronal reformations were obtained from the lung bases to the pubic symphysis following the IV administration of 80 mL of Omnipaque 350    COMPARISON:  None.    FINDINGS:  Lower chest: Unremarkable.    Liver: Unremarkable.    Gallbladder and bile ducts: Gallbladder not seen, may be surgically absent.    Pancreas: Unremarkable.    Spleen: Unremarkable.    Adrenals: Unremarkable.    Kidneys: Unremarkable.    Lymph nodes: No abdominal or pelvic  lymphadenopathy.    Bowel and mesentery: No definite evidence of acute bowel obstruction.  Appendix measures at the upper limits of normal at approximately 7-8 mm without definite wall thickening or associated periappendiceal inflammatory change.  Colonic diverticulosis.  Nonspecific intraluminal fluid within normal caliber colon, as may be seen in setting of a nonspecific diarrheal illness.    Mild wall thickening and enhancement of mid to distal small bowel loops with intraluminal fluid.    Abdominal aorta: Unremarkable.    Inferior vena cava: Unremarkable.    Free fluid or free air: None.    Pelvis: Unremarkable.    Body wall: Small fat containing ventral/umbilical hernia.    Bones: Unremarkable.                                      Assessment/Plan:     * Sepsis  Presents with nausea, vomiting, diarrhea. WBC of 16, HR of 108, lactic acid 3.0. CT scan with findings suggesting enteritis.   NPO-advance as tolerated  Zosyn  Blood cultures pending  Check stool studies, with culture, WBC, and C diff.   CT scan with upper limits of normal appendix without findings suggesting inflammation. No discrete RLQ tenderness. Suspect symptoms related to eneteritis.       This patient does have evidence of infective focus  My overall impression is sepsis.  Source: Abdominal  Antibiotics given-   Antibiotics (72h ago, onward)      Start     Stop Route Frequency Ordered    05/03/24 1028  piperacillin-tazobactam (ZOSYN) 4.5 g in dextrose 5 % in water (D5W) 100 mL IVPB (MB+)         -- IV Every 8 hours (non-standard times) 05/03/24 1028          Latest lactate reviewed-  Recent Labs   Lab 05/03/24  0830   LACTATE 3.0*     Organ dysfunction indicated by  N/a    Fluid challenge Ideal Body Weight- The patient's ideal body weight is Ideal body weight: 47.8 kg (105 lb 6.1 oz) which will be used to calculate fluid bolus of 30 ml/kg for treatment of septic shock.      Post- resuscitation assessment Yes Perfusion exam was performed within 6  hours of septic shock presentation after bolus shows Adequate tissue perfusion assessed by non-invasive monitoring       Will Not start Pressors- Levophed for MAP of 65  Source control achieved by: IV antibiotics    Enteritis  As above      VTE Risk Mitigation (From admission, onward)           Ordered     IP VTE LOW RISK PATIENT  Once         05/03/24 1104     Place sequential compression device  Until discontinued         05/03/24 1104                     Discussed with ED physician.    VTE: gaurang/scd  Code: Full  Diet: NPO   Dispo: pending tolerating PO and blood cultures   On 05/03/2024, patient should be placed in hospital observation services under my care in collaboration with Katja Jackson MD.      AdmissionCare    Guideline: Abdominal Pain, Undiagnosed, Observation    Based on the indications selected for the patient, the bed status of Observation was determined to be MET    The following indications were selected as present at the time of evaluation of the patient:      - Pain that persists despite emergency department care   - Patient ability to maintain hydration unclear    AdmissionCare documentation entered by: Tatyana Salas    [employer], 27th edition, Copyright © 2023 [employer] All Rights Reserved.  [id number]:52:55-05:00    Teddy Mendoza PA-C  Department of Hospital Medicine  [facility] [place]

## 2024-05-04 VITALS
SYSTOLIC BLOOD PRESSURE: 120 MMHG | HEART RATE: 69 BPM | DIASTOLIC BLOOD PRESSURE: 68 MMHG | WEIGHT: 139 LBS | OXYGEN SATURATION: 98 % | RESPIRATION RATE: 18 BRPM | HEIGHT: 61 IN | BODY MASS INDEX: 26.24 KG/M2 | TEMPERATURE: 98 F

## 2024-05-04 LAB
ANION GAP SERPL CALC-SCNC: 7 MMOL/L (ref 8–16)
BUN SERPL-MCNC: 11 MG/DL (ref 6–20)
C DIFF GDH STL QL: NEGATIVE
C DIFF TOX A+B STL QL IA: NEGATIVE
CALCIUM SERPL-MCNC: 7.9 MG/DL (ref 8.7–10.5)
CHLORIDE SERPL-SCNC: 114 MMOL/L (ref 95–110)
CO2 SERPL-SCNC: 19 MMOL/L (ref 23–29)
CREAT SERPL-MCNC: 0.8 MG/DL (ref 0.5–1.4)
ERYTHROCYTE [DISTWIDTH] IN BLOOD BY AUTOMATED COUNT: 12.1 % (ref 11.5–14.5)
EST. GFR  (NO RACE VARIABLE): >60 ML/MIN/1.73 M^2
GLUCOSE SERPL-MCNC: 85 MG/DL (ref 70–110)
HCT VFR BLD AUTO: 31.5 % (ref 37–48.5)
HGB BLD-MCNC: 10.5 G/DL (ref 12–16)
MAGNESIUM SERPL-MCNC: 2 MG/DL (ref 1.6–2.6)
MCH RBC QN AUTO: 31 PG (ref 27–31)
MCHC RBC AUTO-ENTMCNC: 33.3 G/DL (ref 32–36)
MCV RBC AUTO: 93 FL (ref 82–98)
PLATELET # BLD AUTO: 195 K/UL (ref 150–450)
PMV BLD AUTO: 9.6 FL (ref 9.2–12.9)
POTASSIUM SERPL-SCNC: 3.4 MMOL/L (ref 3.5–5.1)
RBC # BLD AUTO: 3.39 M/UL (ref 4–5.4)
RV AG STL QL IA.RAPID: NEGATIVE
SODIUM SERPL-SCNC: 140 MMOL/L (ref 136–145)
WBC # BLD AUTO: 8.2 K/UL (ref 3.9–12.7)
WBC #/AREA STL HPF: NORMAL /[HPF]

## 2024-05-04 PROCEDURE — 83735 ASSAY OF MAGNESIUM: CPT | Performed by: NURSE PRACTITIONER

## 2024-05-04 PROCEDURE — 36415 COLL VENOUS BLD VENIPUNCTURE: CPT | Performed by: NURSE PRACTITIONER

## 2024-05-04 PROCEDURE — 87427 SHIGA-LIKE TOXIN AG IA: CPT | Mod: 59 | Performed by: NURSE PRACTITIONER

## 2024-05-04 PROCEDURE — 87046 STOOL CULTR AEROBIC BACT EA: CPT | Performed by: NURSE PRACTITIONER

## 2024-05-04 PROCEDURE — 87209 SMEAR COMPLEX STAIN: CPT | Performed by: NURSE PRACTITIONER

## 2024-05-04 PROCEDURE — 87425 ROTAVIRUS AG IA: CPT | Performed by: PHYSICIAN ASSISTANT

## 2024-05-04 PROCEDURE — 89055 LEUKOCYTE ASSESSMENT FECAL: CPT | Performed by: NURSE PRACTITIONER

## 2024-05-04 PROCEDURE — G0378 HOSPITAL OBSERVATION PER HR: HCPCS

## 2024-05-04 PROCEDURE — 87045 FECES CULTURE AEROBIC BACT: CPT | Performed by: NURSE PRACTITIONER

## 2024-05-04 PROCEDURE — 25000003 PHARM REV CODE 250: Performed by: NURSE PRACTITIONER

## 2024-05-04 PROCEDURE — 96366 THER/PROPH/DIAG IV INF ADDON: CPT

## 2024-05-04 PROCEDURE — 63600175 PHARM REV CODE 636 W HCPCS: Performed by: NURSE PRACTITIONER

## 2024-05-04 PROCEDURE — 80048 BASIC METABOLIC PNL TOTAL CA: CPT | Performed by: NURSE PRACTITIONER

## 2024-05-04 PROCEDURE — 87449 NOS EACH ORGANISM AG IA: CPT | Mod: 91 | Performed by: NURSE PRACTITIONER

## 2024-05-04 PROCEDURE — 87449 NOS EACH ORGANISM AG IA: CPT | Performed by: NURSE PRACTITIONER

## 2024-05-04 PROCEDURE — 96361 HYDRATE IV INFUSION ADD-ON: CPT

## 2024-05-04 PROCEDURE — 87186 SC STD MICRODIL/AGAR DIL: CPT | Mod: 59 | Performed by: NURSE PRACTITIONER

## 2024-05-04 PROCEDURE — 85027 COMPLETE CBC AUTOMATED: CPT | Performed by: NURSE PRACTITIONER

## 2024-05-04 PROCEDURE — 87077 CULTURE AEROBIC IDENTIFY: CPT | Mod: 59 | Performed by: NURSE PRACTITIONER

## 2024-05-04 RX ORDER — POTASSIUM CHLORIDE 20 MEQ/1
40 TABLET, EXTENDED RELEASE ORAL ONCE
Status: COMPLETED | OUTPATIENT
Start: 2024-05-04 | End: 2024-05-04

## 2024-05-04 RX ORDER — AMOXICILLIN AND CLAVULANATE POTASSIUM 875; 125 MG/1; MG/1
1 TABLET, FILM COATED ORAL 2 TIMES DAILY
Qty: 10 TABLET | Refills: 0 | Status: SHIPPED | OUTPATIENT
Start: 2024-05-04 | End: 2024-05-09

## 2024-05-04 RX ADMIN — PIPERACILLIN AND TAZOBACTAM 4.5 G: 4; .5 INJECTION, POWDER, LYOPHILIZED, FOR SOLUTION INTRAVENOUS; PARENTERAL at 02:05

## 2024-05-04 RX ADMIN — SODIUM CHLORIDE: 9 INJECTION, SOLUTION INTRAVENOUS at 01:05

## 2024-05-04 RX ADMIN — POTASSIUM CHLORIDE 40 MEQ: 1500 TABLET, EXTENDED RELEASE ORAL at 03:05

## 2024-05-04 NOTE — PLAN OF CARE
05/04/2024      Millicent Sun  [address]   [place] [address]          Hospital Medicine Dept.  [facility] [address] [place] [address]  [phone] after hours  [phone] fax Millicent Sun has been hospitalized at the [facility] since 5/3/2024 to 5/4/2024.  Please excuse the patient from duties.  Patient may return on Monday 5/6/2024.  No restrictions.     Please contact me if you have any questions.                  __________________________  Kelly Feliz NP  05/04/2024

## 2024-05-04 NOTE — HOSPITAL COURSE
Admission for sepsis due to acute gastroenteritis.  IV fluid and  Zosyn started.   CT scan shows enteritis.  Lactic acid trend normal. No further nausea vomiting diarrhea during observation stay.  Patient has no abdominal pain on exam on discharge.  Stool studies pending on discharge.  Okay to discharge home with oral antibiotics.

## 2024-05-04 NOTE — NURSING
AVS virtually reviewed with patient via  # [id number] in its entirety with emphasis on medications, follow-up appointments and reasons to return to the ED or contact the [facility] Nurse [facility] Line. Patient also encouraged to utilize their patient portal. Ease and convenience of use reiterated. Education complete and patient voiced understanding. All questions answered. Discharge teaching complete.

## 2024-05-04 NOTE — PLAN OF CARE
Patient is ready and clear for discharge from Case Management's perspective; informed patient's nurseShona. Discussed and provided patient with written discharge instruction and education.

## 2024-05-04 NOTE — PLAN OF CARE
05/04/24 0936   Post-Acute Status   Post-Acute Authorization Other  (Home; follow-up)   Other Status No Post-Acute Service Needs   Hospital Resources/Appts/Education Provided Provided patient/caregiver with written discharge plan information;Provided education on problems/symptoms using teachback;Appointments scheduled and added to AVS;Post-Acute resouces added to AVS   Discharge Delays None known at this time   Discharge Plan   Discharge Plan A Home with family  (Follow-up)   Discharge Plan B Home with family  (Follow-up)

## 2024-05-04 NOTE — DISCHARGE SUMMARY
[facility] Medicine  Discharge Summary      Patient Name: Millicent Sun  MRN: [id number]  CULLEN: [id number]  Patient Class: OP- Observation  Admission Date: 5/3/2024  Hospital Length of Stay: 0 days  Discharge Date and Time:  05/04/2024 9:57 AM  Attending Physician: Katja Jackson MD   Discharging Provider: Kelly Romano NP  Primary Care Provider: Unable, To Obtain    Primary Care Team: KELLY ROMANO    HPI:   Millicent Sun 43 y.o. female with no PMHx the hospital with a chief complaint of abdominal pain.  She reports 4 days of constant progressive abdominal pain described as cramping throughout her entire abdomen.  It was associated with nausea vomiting and diarrhea.  She reports her last episode of diarrhea was this morning.  She attempted treatment home with Pepto-Bismol without improvement.  She denies any recent antibiotic use travel or sick contacts.  She takes no daily medications.  She denies fever chest pain shortness breast leg swelling melena hematuria hematemesis dizziness and syncope.    In the ED, white blood cell count 16 tachycardic on arrival to 108 lactic acid 3.0 lipase negative CT scan with  Mild wall thickening and mucosal hyperemia of the distal mid small bowel loops which may reflect infectious or non infectious inflammatory enteritis.  History obtained with language line  ID# [id number].     * No surgery found *      Hospital Course:   Admission for sepsis due to acute gastroenteritis.  IV fluid and  Zosyn started.   CT scan shows enteritis.  Lactic acid trend normal. No further nausea vomiting diarrhea during observation stay.  Patient has no abdominal pain on exam on discharge.  Stool studies pending on discharge.  Okay to discharge home with oral antibiotics.      Goals of Care Treatment Preferences:  Code Status: Full Code      Consults:     No new Assessment & Plan notes have been filed under this hospital service since the last note was  generated.  Service: Hospital Medicine    Final Active Diagnoses:    Diagnosis Date Noted POA    PRINCIPAL PROBLEM:  Sepsis [A41.9] 05/03/2024 Unknown    Enteritis [K52.9] 05/03/2024 Unknown      Problems Resolved During this Admission:       Discharged Condition: stable    Disposition: Home or Self Care    Follow Up:   Follow-up Information       [facility] -. Schedule an appointment as soon as possible for a visit.    Why: Call to schedule an appointment to establish care for all future medical needs. Clinic opens at 7am and schedules same day apointments  Contact information:  [address] [place] [address]  [phone]                           Patient Instructions:      Diet Adult Regular     Notify your health care provider if you experience any of the following:  temperature >100.4     Notify your health care provider if you experience any of the following:  redness, tenderness, or signs of infection (pain, swelling, redness, odor or green/yellow discharge around incision site)     Notify your health care provider if you experience any of the following:  persistent dizziness, light-headedness, or visual disturbances     Activity as tolerated       Significant Diagnostic Studies: N/A    Pending Diagnostic Studies:       Procedure Component Value Units Date/Time    Rotavirus antigen, stool [[id number]] Collected: 05/04/24 0636    Order Status: Sent Lab Status: In process Updated: 05/04/24 0706    Specimen: Stool     Stool Exam-Ova,Cysts,Parasites [[id number]] Collected: 05/04/24 0636    Order Status: Sent Lab Status: In process Updated: 05/04/24 0704    Specimen: Stool     WBC, Stool [[id number]] Collected: 05/04/24 0636    Order Status: Sent Lab Status: In process Updated: 05/04/24 0705    Specimen: Stool            Medications:  Reconciled Home Medications:      Medication List        START taking these medications      amoxicillin-clavulanate 875-125mg 875-125 mg per tablet  Commonly  known as: AUGMENTIN  Take 1 tablet by mouth 2 (two) times a day. for 5 days              Indwelling Lines/Drains at time of discharge:   Lines/Drains/Airways       None                   Time spent on the discharge of patient: 35 minutes         Kelly Feliz NP  Department of Hospital Medicine  [facility] - Observation

## 2024-05-04 NOTE — PLAN OF CARE
05/04/24 0937   Final Note   Assessment Type Final Discharge Note   Anticipated Discharge Disposition Home  (Follow-up)   What phone number can be called within the next 1-3 days to see how you are doing after discharge?   ([phone])   Hospital Resources/Appts/Education Provided Provided patient/caregiver with written discharge plan information;Provided education on problems/symptoms using teachback;Appointments scheduled and added to AVS;Post-Acute resouces added to AVS   Post-Acute Status   Post-Acute Authorization Other  (Home; follow-up)   Other Status No Post-Acute Service Needs   Discharge Delays None known at this time

## 2024-05-04 NOTE — PROGRESS NOTES
[facility], [facility] - [place]  Nurses Note -- 4 Eyes      5/4/2024       Skin assessed on: Q Shift      [x] No Pressure Injuries Present    []Prevention Measures Documented    [] Yes LDA  for Pressure Injury Previously documented     [] Yes New Pressure Injury Discovered   [] LDA for New Pressure Injury Added      Attending RN:  Eduardo Cantrell RN     Second RN:  Ezequiel Mckinley RN

## 2024-05-04 NOTE — PLAN OF CARE
Case Management Final Discharge Note  Discharge Disposition: Home; follow-up  New DME ordered/Company Name: None  Relevant SDOH/Transition of Care Barriers: None  Primary Caretaker and Contact Information: Millicent: [phone]  Scheduled Follow-Up Appointment: Into AVS  Referrals Placed:None  Transportation:Family    Patient and family educated on discharge services and updated on DC plan. Bedside RN notified, patient clear to discharge from Case Management Perspective.

## 2024-05-04 NOTE — NURSING
[facility], [facility] - [place]  Nurses Note -- 4 Eyes      5/3/2024       Skin assessed on: Q Shift      [x] No Pressure Injuries Present    [x]Prevention Measures Documented    [] Yes LDA  for Pressure Injury Previously documented     [] Yes New Pressure Injury Discovered   [] LDA for New Pressure Injury Added      Attending RN:  Ezequiel Mckinley RN     Second RN:  Sherly Dillon RN

## 2024-05-05 LAB
E COLI SXT1 STL QL IA: NEGATIVE
E COLI SXT2 STL QL IA: NEGATIVE

## 2024-05-07 LAB
BACTERIA BLD CULT: NORMAL
BACTERIA BLD CULT: NORMAL

## 2024-05-08 LAB — BACTERIA STL CULT: NORMAL

## 2024-05-09 LAB — O+P STL MICRO: NORMAL
